# Patient Record
Sex: MALE | Race: WHITE
[De-identification: names, ages, dates, MRNs, and addresses within clinical notes are randomized per-mention and may not be internally consistent; named-entity substitution may affect disease eponyms.]

---

## 2020-07-28 ENCOUNTER — HOSPITAL ENCOUNTER (EMERGENCY)
Dept: HOSPITAL 74 - JER | Age: 75
Discharge: LEFT BEFORE DISCHARGE | End: 2020-07-28
Payer: COMMERCIAL

## 2020-07-28 VITALS — HEART RATE: 70 BPM | DIASTOLIC BLOOD PRESSURE: 87 MMHG | SYSTOLIC BLOOD PRESSURE: 143 MMHG

## 2020-07-28 VITALS — TEMPERATURE: 97.7 F

## 2020-07-28 VITALS — BODY MASS INDEX: 32.1 KG/M2

## 2020-07-28 DIAGNOSIS — R55: Primary | ICD-10-CM

## 2020-07-28 LAB
ALBUMIN SERPL-MCNC: 3.3 G/DL (ref 3.4–5)
ALP SERPL-CCNC: 48 U/L (ref 45–117)
ALT SERPL-CCNC: 17 U/L (ref 13–61)
ANION GAP SERPL CALC-SCNC: 8 MMOL/L (ref 8–16)
AST SERPL-CCNC: 16 U/L (ref 15–37)
BASOPHILS # BLD: 0.9 % (ref 0–2)
BILIRUB SERPL-MCNC: 0.9 MG/DL (ref 0.2–1)
BUN SERPL-MCNC: 19.4 MG/DL (ref 7–18)
CALCIUM SERPL-MCNC: 8.9 MG/DL (ref 8.5–10.1)
CHLORIDE SERPL-SCNC: 108 MMOL/L (ref 98–107)
CO2 SERPL-SCNC: 24 MMOL/L (ref 21–32)
CREAT SERPL-MCNC: 1.2 MG/DL (ref 0.55–1.3)
DEPRECATED RDW RBC AUTO: 14.1 % (ref 11.9–15.9)
EOSINOPHIL # BLD: 1.3 % (ref 0–4.5)
GLUCOSE SERPL-MCNC: 146 MG/DL (ref 74–106)
HCT VFR BLD CALC: 40.4 % (ref 35.4–49)
HGB BLD-MCNC: 13.4 GM/DL (ref 11.7–16.9)
LYMPHOCYTES # BLD: 31.2 % (ref 8–40)
MCH RBC QN AUTO: 29.9 PG (ref 25.7–33.7)
MCHC RBC AUTO-ENTMCNC: 33.3 G/DL (ref 32–35.9)
MCV RBC: 90 FL (ref 80–96)
MONOCYTES # BLD AUTO: 7.3 % (ref 3.8–10.2)
NEUTROPHILS # BLD: 59.3 % (ref 42.8–82.8)
PLATELET # BLD AUTO: 216 K/MM3 (ref 134–434)
PMV BLD: 8.9 FL (ref 7.5–11.1)
POTASSIUM SERPLBLD-SCNC: 4 MMOL/L (ref 3.5–5.1)
PROT SERPL-MCNC: 7.1 G/DL (ref 6.4–8.2)
RBC # BLD AUTO: 4.49 M/MM3 (ref 4–5.6)
SODIUM SERPL-SCNC: 140 MMOL/L (ref 136–145)
WBC # BLD AUTO: 9.2 K/MM3 (ref 4–10)

## 2020-07-28 PROCEDURE — 3E0234Z INTRODUCTION OF SERUM, TOXOID AND VACCINE INTO MUSCLE, PERCUTANEOUS APPROACH: ICD-10-PCS

## 2020-07-28 NOTE — PDOC
History of Present Illness





- General


Chief Complaint: Syncope/Near Syncope


Stated Complaint: SYNCOPE


Time Seen by Provider: 07/28/20 09:27





- History of Present Illness


Initial Comments: 





Polo Mayorga is a 74 y/o male with PMH significant for HTN, HLD, s/p 

cardiac stent x1 on aspirin (no plavix), presenting today after syncope. Reports

that he was standing in a parking lot waiting for his wife when he started 

feeling lightheaded and woke up on the ground. Reports that he had a similar 

episode in March and attributes this to it being hot outside. Unwitnessed. 





Denies chest pain/shortness of breath. Denies heart palpitations. Denies 

decrease in appetitie. No dysuria/diarrhea. No fever/chills.





No PMH or FamHx of seizure. Had a cardiology appt a couple of months ago. 





SocHx: no caffeine/ETOH use 





Cards: Dr. Leigh 








Past History





- Medical History


Allergies/Adverse Reactions: 


                                    Allergies











Allergy/AdvReac Type Severity Reaction Status Date / Time


 


No Known Allergies Allergy   Verified 07/28/20 09:33











Home Medications: 


Ambulatory Orders





Amlodipine Besylate 5 mg PO DAILY 07/28/20 


Aspirin 81 mg PO DAILY 07/28/20 


Losartan Potassium 100 mg PO DAILY 07/28/20 


Metoprolol Succinate 50 mg PO DAILY 07/28/20 


Rosuvastatin Calcium [Crestor] 40 mg PO DAILY 07/28/20 








COPD: No


HTN: Yes





- Immunization History


Immunization Up to Date: No





- Psycho-Social/Smoking History


Smoking History: Former smoker


Have you smoked in the past 12 months: No


If you are a former smoker, when did you quit?: 30 years ago


Information on smoking cessation initiated: No





- Substance Abuse Hx (Audit-C & DAST Scrn)


How often the patient has a drink containing alcohol: Monthly or less


How often the patient has six or more drinks on one occasion: Never


Score: In Men: 4 or > Positive; In Women: 3 or > Positive: 1


Screen Result (Pos requires Nsg. Audit-10AR): Negative


In the last yr the pt used illegal drug/Rx for NonMed reason: No


Score:  Yes response is considered Positive: 0


Screen Result (Positive result requires Nsg. DAST-10): Negative





**Review of Systems





- Review of Systems


Comments:: 








GENERAL/CONSTITUTIONAL: No fever or chills. No weakness._


HEAD, EYES, EARS, NOSE AND THROAT: No change in vision. No change in hearing. No

 sore throat._


CARDIOVASCULAR: No chest pain or shortness of breath_


RESPIRATORY: Denies cough, hemoptysis_


GASTROINTESTINAL: No nausea, vomiting, diarrhea or constipation._


GENITOURINARY: No dysuria, frequency, or change in urination._


MUSCULOSKELETAL: No joint or muscle swelling or pain. No neck or back pain._


SKIN: No rash_


NEUROLOGIC: Reports loss of consciousness and syncope. Reprots lightheadedness. 

No headache, vertigo, or change in strength/sensation._


ENDOCRINE: No increased thirst. No abnormal weight change_


HEMATOLOGIC/LYMPHATIC: No anemia, easy bleeding, or history of blood clots._


ALLERGIC/IMMUNOLOGIC: No hives or skin allergy._











*Physical Exam





- Vital Signs


                                Last Vital Signs











Temp Pulse Resp BP Pulse Ox


 


 97.7 F   70   19   143/87   99 


 


 07/28/20 09:33  07/28/20 14:06  07/28/20 14:06  07/28/20 14:06  07/28/20 14:06














- Physical Exam





GENERAL: Awake, alert, and oriented to person/place/time, in no acute distress_


HEAD: Abrasion over right forehead and bridge of nose, no facial TTP. 


EYES: PERRLA, EOMI, sclera anicteric, conjunctiva clear_


ENT: Hearing grossly normal, nares patent, oropharynx clear without exudates. No

 uvular deviation. Moist mucosa_


NECK: Normal ROM, supple, no lymphadenopathy, JVD, or masses. No c-spine TTP. 


LUNGS: No distress, speaks in full sentences, clear to auscultation bilaterally 

_


HEART: Regular rate and rhythm, normal S1 and S2, no murmurs appreciated, 

peripheral pulses normal and equal bilaterally._


ABDOMEN: Soft, nontender, normoactive bowel sounds. No guarding, no rebound. No 

masses_


EXTREMITIES: Normal inspection, Normal range of motion, no edema. No clubbing or

 cyanosis_


NEUROLOGICAL:


CN II-XII tested and intact.


Sensation intact to sharp/dull differentiation in all extremities.


Motor: Normal tone and bulk. No abnormal movements appreciated. No pronator 

drift. Strength tested and 5/5 in bilateral wrist flexion/extension, elbow 

flexion/extension, shoulder abduction, straight leg raise, knee 

flexion/extension, ankle dorsiflexion/plantarflexion. Patient ambulates with a 

steady gait.


Coordination: Finger to nose and heel to shin testing intact bilaterally.


SKIN: Warm, Dry, normal turgor, no rashes or lesions noted_














ED Treatment Course





- LABORATORY


CBC & Chemistry Diagram: 


                                 07/28/20 10:02





                                 07/28/20 10:06





- ADDITIONAL ORDERS


Additional order review: 


                               Laboratory  Results











  07/28/20 07/28/20





  10:06 09:35


 


Sodium  140 


 


Potassium  4.0 


 


Chloride  108 H 


 


Carbon Dioxide  24 


 


Anion Gap  8 


 


BUN  19.4 H 


 


Creatinine  1.2 


 


Est GFR (CKD-EPI)AfAm  67.20 


 


Est GFR (CKD-EPI)NonAf  57.98 


 


POC Glucometer   146


 


Random Glucose  146 H 


 


Calcium  8.9 


 


Total Bilirubin  0.9 


 


AST  16 


 


ALT  17 


 


Alkaline Phosphatase  48 


 


Creatine Kinase  109 


 


Troponin I  < 0.02 


 


Total Protein  7.1 


 


Albumin  3.3 L 








                                        











  07/28/20 07/28/20





  10:02 09:35


 


RBC  4.49 


 


MCV  90.0 


 


MCHC  33.3 


 


RDW  14.1 


 


MPV  8.9 


 


Neutrophils %  59.3 


 


Lymphocytes %  31.2 


 


Monocytes %  7.3 


 


Eosinophils %  1.3 


 


Basophils %  0.9 


 


POC Glucometer   146














- RADIOLOGY


Radiology Studies Ordered: 














 Category Date Time Status


 


 CERVICAL SPINE CT W/O CONTR [CT] Stat CT Scan  07/28/20 09:36 Completed


 


 FACIAL BONES CT W/O CONTRAST [CT] Stat CT Scan  07/28/20 09:38 Completed


 


 HEAD CT WITHOUT CONTRAST [CT] Stat CT Scan  07/28/20 09:36 Completed


 


 CHEST X-RAY PORTABLE* [RAD] Stat Radiology  07/28/20 09:36 Completed














- Medications


Given in the ED: 


ED Medications














Discontinued Medications














Generic Name Dose Route Start Last Admin





  Trade Name Freq  PRN Reason Stop Dose Admin


 


Bacitracin  1 applic  07/28/20 13:17  07/28/20 14:02





  Bacitracin -  TP  07/28/20 13:18  1 tube





  ONCE ONE   Administration


 


Diphtheria/Tetanus/Acell Pertussis  0.5 ml  07/28/20 13:16  07/28/20 14:02





  Boostrix -  IM  07/28/20 13:17  0.5 ml





  .ONCE ONE   Administration


 


Sodium Chloride  1,000 ml  07/28/20 09:53  07/28/20 10:17





  Normal Saline -  IV  07/28/20 09:54  1,000 ml





  ONCE ONE   Administration














Medical Decision Making





- Medical Decision Making





07/28/20 09:39


75M hx of HTN HLD s/p stent presenting today with syncope and fall from 

standing. Similar episode in March. Follows cardiology. 


DDx includes vasovagal syncope vs cardiogenic syncope vs hypoglycemia vs other 

syncopal etiology. 


-cbc, cmp


-ekg, trop, cxr


-CT head, neck, facial bones 





07/28/20 09:47


EKG shows 57 bpm, sinus bradycardia, no axis deviation, , QTc 438, no ST 

elevation/depression, no prior EKG for comparison. 





07/28/20 10:27


CXR read shows no acute chest pathology. 





07/28/20 11:17


Labs reviewed.





                             Laboratory Last Values











WBC  9.2 K/mm3 (4.0-10.0)   07/28/20  10:02    


 


RBC  4.49 M/mm3 (4.00-5.60)   07/28/20  10:02    


 


Hgb  13.4 GM/dL (11.7-16.9)   07/28/20  10:02    


 


Hct  40.4 % (35.4-49)   07/28/20  10:02    


 


MCV  90.0 fl (80-96)   07/28/20  10:02    


 


MCH  29.9 pg (25.7-33.7)   07/28/20  10:02    


 


MCHC  33.3 g/dl (32.0-35.9)   07/28/20  10:02    


 


RDW  14.1 % (11.9-15.9)   07/28/20  10:02    


 


Plt Count  216 K/MM3 (134-434)   07/28/20  10:02    


 


MPV  8.9 fl (7.5-11.1)   07/28/20  10:02    


 


Absolute Neuts (auto)  5.5 K/mm3 (1.5-8.0)   07/28/20  10:02    


 


Neutrophils %  59.3 % (42.8-82.8)   07/28/20  10:02    


 


Lymphocytes %  31.2 % (8-40)   07/28/20  10:02    


 


Monocytes %  7.3 % (3.8-10.2)   07/28/20  10:02    


 


Eosinophils %  1.3 % (0-4.5)   07/28/20  10:02    


 


Basophils %  0.9 % (0-2.0)   07/28/20  10:02    


 


Nucleated RBC %  0 % (0-0)   07/28/20  10:02    


 


Sodium  140 mmol/L (136-145)   07/28/20  10:06    


 


Potassium  4.0 mmol/L (3.5-5.1)   07/28/20  10:06    


 


Chloride  108 mmol/L ()  H  07/28/20  10:06    


 


Carbon Dioxide  24 mmol/L (21-32)   07/28/20  10:06    


 


Anion Gap  8 MMOL/L (8-16)   07/28/20  10:06    


 


BUN  19.4 mg/dL (7-18)  H  07/28/20  10:06    


 


Creatinine  1.2 mg/dL (0.55-1.3)   07/28/20  10:06    


 


Est GFR (CKD-EPI)AfAm  67.20   07/28/20  10:06    


 


Est GFR (CKD-EPI)NonAf  57.98   07/28/20  10:06    


 


POC Glucometer  146 UNITS ()   07/28/20  09:35    


 


Random Glucose  146 mg/dL ()  H  07/28/20  10:06    


 


Calcium  8.9 mg/dL (8.5-10.1)   07/28/20  10:06    


 


Total Bilirubin  0.9 mg/dL (0.2-1)   07/28/20  10:06    


 


AST  16 U/L (15-37)   07/28/20  10:06    


 


ALT  17 U/L (13-61)   07/28/20  10:06    


 


Alkaline Phosphatase  48 U/L ()   07/28/20  10:06    


 


Creatine Kinase  109 U/L ()   07/28/20  10:06    


 


Troponin I  < 0.02 ng/ml (0.00-0.05)   07/28/20  10:06    


 


Total Protein  7.1 g/dl (6.4-8.2)   07/28/20  10:06    


 


Albumin  3.3 g/dl (3.4-5.0)  L  07/28/20  10:06    














07/28/20 12:17


CT head shows: Mild atrophy without gross CT evidence of acute intracranial 

pathology.


CT facial bones shows minimally displaced fracture at the tip of the nasal bone,

 of indeterminate age. No other gross fracture. 


CT c-spine shows no acute fracture, or subluxation. 





07/28/20 13:46


The patient has requested to leave the ED against medical advice. The patient 

reason(s) for leaving include, but are not limited to, the following: would like

 to follow up with his own cardiologist Dr. Leigh outpatient and does not feel

 he needs additional work up at this time and he has fainted before. I believe 

this patient is of sound mind and competent to refuse medical care. The patient 

is responding and asking questions appropriately. The patient is oriented to 

person, place and time. The patient is not psychotic, delusional, suicidal, 

homicidal or hallucinating. The patient demonstrates a normal mental capacity to

 make decisions regarding their healthcare. The patient is clinically sober and 

does not appear to be under the influence of any illicit drugs at this time. The

 patient has been advised of the risks, in layman terms, of leaving AMA which 

include, but are not limited to death, coma, permanent disability, loss of 

current lifestyle, delay in diagnosis. Alternatives have been offered - the 

patient remains steadfast in their wish to leave. The patient has been advised 

that should they change their mind they are welcome to return to this hospital, 

or any other, at any time. The patient understands that in no way does an AMA 

discharge mean that I do not want them to have the best medical care available. 

To this end, I have provided appropriate prescriptions, referrals, and discharge

 instructions. The patient did sign AMA paperwork. The above discussion was 

witnessed by another member of staff.








Discharge





- Discharge Information


Problems reviewed: Yes


Clinical Impression/Diagnosis: 


 Syncope





Condition: Stable


Disposition: AGAINST MEDICAL ADVICE





- Admission


No





- Follow up/Referral


Referrals: 


Dante Leigh [Primary Care Provider] - 





- Patient Discharge Instructions


Patient Printed Discharge Instructions:  DI for Syncope in Adults (Fainting)


Additional Instructions: 


Please make a follow up appointment with your primary care doctor and with your 

cardiologist.





If you experience any new, worsening, or concerning symptoms, including chest 

pain, shortness of breath, worsening dizziness, loss of consciousness, falls, or

any other concern, please return to the emergency department. 





- Post Discharge Activity

## 2020-07-28 NOTE — PDOC
Attending Attestation





- Resident


Resident Name: Lg Diaz





- ED Attending Attestation


I have performed the following: I have examined & evaluated the patient, The 

case was reviewed & discussed with the resident, I agree w/resident's findings &

plan, Exceptions are as noted





- HPI


HPI: 





07/28/20 09:48


75y M hx of htn, hl, cad sp stent in 2000, presents for evaluation of syncope. 

The patient was in his usual state of health until this morning, he was waiting 

for his wife in the shade for about an hour, when he felt lightheaded and 

alittle sweaty, then realized he was on the ground.  He denies any associated 

cp, sob, headache, palpitations, neck pain, back pain, vomiting, tongue biting, 

urinary or bowel incontineunce, focal numbness/tingling/weakness, vision 

changes. He denie sany recent fever/chills, cough, diarrhea, melena, bpr, 

dysuria. 


Pt has a history of syncope 2x prior to this time, has been worked up each time 

and thought to be possibly vasovagal. 


No recent changes in medications





PMD: Dr. Waterman








- Physicial Exam


PE: 





07/28/20 10:13


GENERAL: The patient is awake, alert, and fully oriented, Nontoxic - in no acute

distress.


HEAD: Normocephalic, superfical abrasions on nasal bridge/R perioribital region 

without fcal obny tenderness/crepitus.


EYES: extraocular movements intact, sclera anicteric, conjunctiva clear.


ENT: Normal voice,  Moist mucous membranes.


NECK: Normal range of motion, supple


LUNGS: Breath sounds equal, clear to auscultation bilaterally.  No wheezes, no 

rhonchi, no rales.


HEART: Regular rate and rhythm, normal S1 and S2 without murmur, rub or gallop.


ABDOMEN: Soft, nontender, No guarding, no rebound.  No CVA tenderness


EXTREMITIES: Normal range of motion, no edema.  


NEUROLOGICAL: No facial assymetry, Normal speech, 


PSYCH: Normal mood, normal affect.


SKIN: Warm, Dry, normal turgor,


Back: No midline tenderness to the cervical, thoracic or lumbar spine


Musculoskelatal: FROM of b/l shoulders, elbows, wrist. FROM of hips, knees, 

ankles - No signs of ecchymosis, erythema, or crepitus noted on palpation 

extremities, chest wall, clavicals, ribs, back.  





- Medical Decision Making





07/28/20 10:16


75-year-old gentleman history of hypertension hyperlipidemia, CAD presenting 

status post syncope. No associated pain although he did feel little 

lightheadedness, nausea, sweaty prior to the syncopal episode.  





Upon arrival the patient noted to have some abrasions on his forehead and right 

periorbital region without any focal tenderness his exam was otherwise 

unremarkable. 





Differential includes but is not limited to Anemia, metabolic derangements, 

arrhythmias, vasovagal syncope, dehydration. 


I considered but as the patient has not had any pain at all, doubt intracranial 

hemorrhage, vascular catastrophe, PE As cause of syncope. 


Will obtain blood work, EKG Chest x-ray,. Place the patient on cardiac monitor 

to evaluate for arrhythmias





Anticipate admission for further management





**Heart Score/ECG Review





- ECG Impressions


Comment:: 





07/28/20 10:18


Twelve-lead EKG was performed and reviewed by me. 


There is normal sinus rhythm with a Rate of 57


The axis is normal. 


The intervals are normal. 


There is normal R wave progression


There are no ST or T wave abnormalities.





Impression: Sinus bradycardia








Discharge





- Discharge Information


Problems reviewed: Yes


Clinical Impression/Diagnosis: 


 Eloped from emergency department





Syncope


Qualifiers:


 Syncope type: unspecified Qualified Code(s): R55 - Syncope and collapse





Condition: Stable


Disposition: ELOPED





- Follow up/Referral


Referrals: 


Dante Leigh [Primary Care Provider] - 





- Patient Discharge Instructions


Patient Printed Discharge Instructions:  DI for Syncope in Adults (Fainting)


Additional Instructions: 


Please make a follow up appointment with your primary care doctor and with your 

cardiologist.





If you experience any new, worsening, or concerning symptoms, including chest 

pain, shortness of breath, worsening dizziness, loss of consciousness, falls, or

any other concern, please return to the emergency department. 





- Post Discharge Activity

## 2020-07-28 NOTE — EKG
Test Reason : 

Blood Pressure : ***/*** mmHG

Vent. Rate : 057 BPM     Atrial Rate : 057 BPM

   P-R Int : 204 ms          QRS Dur : 088 ms

    QT Int : 448 ms       P-R-T Axes : 040 003 078 degrees

   QTc Int : 436 ms

 

*** POOR DATA QUALITY, INTERPRETATION MAY BE ADVERSELY AFFECTED

SINUS BRADYCARDIA

OTHERWISE NORMAL ECG

NO PREVIOUS ECGS AVAILABLE

Confirmed by MD YOVANI, CECILIA ([id number]) on 7/28/2020 1:26:07 PM

 

Referred By:             Confirmed By:CECILIA PINA MD